# Patient Record
Sex: FEMALE | Race: BLACK OR AFRICAN AMERICAN | Employment: FULL TIME | URBAN - METROPOLITAN AREA
[De-identification: names, ages, dates, MRNs, and addresses within clinical notes are randomized per-mention and may not be internally consistent; named-entity substitution may affect disease eponyms.]

---

## 2019-02-01 LAB
EXTERNAL CHLAMYDIA RESULT: NOT DETECTED
N GONORRHOEA RRNA SPEC QL PROBE: NOT DETECTED

## 2021-05-24 NOTE — PROGRESS NOTES
Assessment/Plan:    Encounter for gynecological examination (general) (routine) without abnormal findings  NP here for well check  Noted spotting last month and a couple of times in past 3 years  Otherwise no complaints  Normal breast and pelvic exams  Pap done  Mammo order given  U/S ordered, will contact with results    PMB (postmenopausal bleeding)  Minimal spotting over the past 3 years, not post coital  U/S ordered, suspect atrophy, will contact if endo bx needed  Diagnoses and all orders for this visit:    Breast cancer screening by mammogram  -     Mammo screening bilateral w 3d & cad; Future    Encounter for gynecological examination (general) (routine) without abnormal findings    PMB (postmenopausal bleeding)  -     US pelvis complete w transvaginal; Future    Screening for malignant neoplasm of the cervix  -     Liquid-based pap, screening    Other orders  -     dronedarone (MULTAQ) 400 mg tablet; Take 400 mg by mouth 2 (two) times a day          Subjective:      Patient ID: Mat Diaz is a 56 y o  female  Here for well check  The following portions of the patient's history were reviewed and updated as appropriate: allergies, current medications, past family history, past medical history, past social history, past surgical history and problem list     Review of Systems  Some spotting, light, rare  No breast, bladder, bowel changes  No new persistent pain, bloating, early satiety or pelvic pressure      Objective:      /70   Ht 5' 11" (1 803 m)   Wt 80 7 kg (178 lb)   BMI 24 83 kg/m²          Physical Exam  General appearance: no distress, pleasant   Vitiligo  Neck: thyroid without nodules or thyromegaly, no palpable adenopathy  Lymph nodes: no palpable adenopathy  Breasts: no masses, nodes or skin changes, well healed scars  Abdomen: soft, non tender, no palpable masses  Pelvic exam: normal atrophic external genitalia, pale secondary to vitiligo, urethral meatus normal, vagina atrophic without lesions, cervix atrophic without lesions, uterus small, non tender, no adnexal masses, non tender  Rectal exam: normal sphincter tone, no masses, RV confirms above

## 2021-05-25 ENCOUNTER — OFFICE VISIT (OUTPATIENT)
Dept: OBGYN CLINIC | Facility: CLINIC | Age: 61
End: 2021-05-25
Payer: COMMERCIAL

## 2021-05-25 VITALS
BODY MASS INDEX: 24.92 KG/M2 | HEIGHT: 71 IN | DIASTOLIC BLOOD PRESSURE: 70 MMHG | SYSTOLIC BLOOD PRESSURE: 110 MMHG | WEIGHT: 178 LBS

## 2021-05-25 DIAGNOSIS — Z12.31 BREAST CANCER SCREENING BY MAMMOGRAM: Primary | ICD-10-CM

## 2021-05-25 DIAGNOSIS — N95.0 PMB (POSTMENOPAUSAL BLEEDING): ICD-10-CM

## 2021-05-25 DIAGNOSIS — Z01.419 ENCOUNTER FOR GYNECOLOGICAL EXAMINATION (GENERAL) (ROUTINE) WITHOUT ABNORMAL FINDINGS: ICD-10-CM

## 2021-05-25 DIAGNOSIS — Z12.4 SCREENING FOR MALIGNANT NEOPLASM OF THE CERVIX: ICD-10-CM

## 2021-05-25 PROCEDURE — G0145 SCR C/V CYTO,THINLAYER,RESCR: HCPCS | Performed by: OBSTETRICS & GYNECOLOGY

## 2021-05-25 PROCEDURE — 99386 PREV VISIT NEW AGE 40-64: CPT | Performed by: OBSTETRICS & GYNECOLOGY

## 2021-05-25 PROCEDURE — 87624 HPV HI-RISK TYP POOLED RSLT: CPT | Performed by: OBSTETRICS & GYNECOLOGY

## 2021-05-25 NOTE — PATIENT INSTRUCTIONS
Please schedule ultrasound and I will contact you (on My Chart) with results  Call for any further bleeding

## 2021-05-25 NOTE — ASSESSMENT & PLAN NOTE
NP here for well check  Noted spotting last month and a couple of times in past 3 years  Otherwise no complaints  Normal breast and pelvic exams  Pap done  Mammo order given   U/S ordered, will contact with results

## 2021-05-25 NOTE — ASSESSMENT & PLAN NOTE
Minimal spotting over the past 3 years, not post coital  U/S ordered, suspect atrophy, will contact if endo bx needed

## 2021-05-26 LAB
HPV HR 12 DNA CVX QL NAA+PROBE: NEGATIVE
HPV16 DNA CVX QL NAA+PROBE: NEGATIVE
HPV18 DNA CVX QL NAA+PROBE: NEGATIVE

## 2021-05-27 LAB
LAB AP GYN PRIMARY INTERPRETATION: NORMAL
Lab: NORMAL

## 2021-06-26 ENCOUNTER — TELEPHONE (OUTPATIENT)
Dept: OBGYN CLINIC | Facility: CLINIC | Age: 61
End: 2021-06-26

## 2021-06-26 NOTE — TELEPHONE ENCOUNTER
Please inform of normal ultrasound with Uterus 6 3 cm with stable small fibroids, largest 2 2 cm  EMS 1 mm  R ov 1 6 cm with subcm simple cysts  L ov 1 7 cm with small simple cysts  No need for endometrial biopsy unless bleeding reoccurs

## 2021-06-28 NOTE — TELEPHONE ENCOUNTER
Called and spoke with patient  Reviewed with patient ultrasound results per Dr Jeremy Hammond  Advised no need for endo biopsy unless bleeding reoccurs  She verbalized understanding

## 2022-05-19 DIAGNOSIS — Z12.31 BREAST CANCER SCREENING BY MAMMOGRAM: ICD-10-CM

## 2022-08-15 NOTE — PROGRESS NOTES
Assessment/Plan:    Encounter for gynecological examination (general) (routine) without abnormal findings  All well, no complaints  Normal breast and pelvic exams  Pap 5/2021, neg/neg, due by 2026  Mammo order given  Last colonoscopy 6/2020, due 2025  Dexa next time, no risk factors  Calcium recs reviewed  Diagnoses and all orders for this visit:    Encounter for screening mammogram for malignant neoplasm of breast  -     Mammo screening bilateral w 3d & cad; Future    Family history of breast cancer  -     Mammo screening bilateral w 3d & cad; Future          Subjective:      Patient ID: Wandalee Bence is a 57 y o  female  HPI Here for well check  The following portions of the patient's history were reviewed and updated as appropriate:   She  has a past medical history of A-fib ([address] ) (2012), Celiac disease, History of mammogram (2020), Papanicolaou smear (2019), Tremors of nervous system, and Vitiligo  She   Patient Active Problem List    Diagnosis Date Noted    Encounter for gynecological examination (general) (routine) without abnormal findings 05/25/2021     She  has a past surgical history that includes ORIF TIBIAL PLATEAU (0847); Breast biopsy; Lipoma resection (1985); and Colonoscopy (06/2020)  Her family history includes Breast cancer (age of onset: 61) in her mother; Dementia in her mother  She  reports that she has been smoking cigars  She has never used smokeless tobacco  She reports current alcohol use  She reports that she does not use drugs  Current Outpatient Medications   Medication Sig Dispense Refill    dronedarone (MULTAQ) 400 mg tablet Take 400 mg by mouth 2 (two) times a day       No current facility-administered medications for this visit  She is allergic to oxycodone-aspirin, prochlorperazine, gluten meal - food allergy, and treenut [nuts - food allergy]       Review of Systems  No PMB, breast, bladder, bowel changes   No new persistent pain, bloating, early satiety or pelvic pressure      Objective:      /68   Ht 5' 11 5" (1 816 m)   Wt 81 4 kg (179 lb 6 4 oz)   Breastfeeding No   BMI 24 67 kg/m²          Physical Exam    General appearance: no distress, pleasant   Vitiligo  Neck: thyroid without nodules or thyromegaly, no palpable adenopathy  Lymph nodes: no palpable adenopathy  Breasts: no masses, nodes or skin changes, well healed scars  Abdomen: soft, non tender, no palpable masses  Pelvic exam: normal atrophic external genitalia, pale secondary to vitiligo, urethral meatus normal, vagina atrophic without lesions, cervix atrophic without lesions, uterus small, non tender, no adnexal masses, non tender  Rectal exam: normal sphincter tone, no masses, RV confirms above

## 2022-08-16 ENCOUNTER — ANNUAL EXAM (OUTPATIENT)
Dept: OBGYN CLINIC | Facility: CLINIC | Age: 62
End: 2022-08-16
Payer: COMMERCIAL

## 2022-08-16 VITALS
WEIGHT: 179.4 LBS | SYSTOLIC BLOOD PRESSURE: 120 MMHG | BODY MASS INDEX: 24.3 KG/M2 | DIASTOLIC BLOOD PRESSURE: 68 MMHG | HEIGHT: 72 IN

## 2022-08-16 DIAGNOSIS — Z80.3 FAMILY HISTORY OF BREAST CANCER: ICD-10-CM

## 2022-08-16 DIAGNOSIS — Z01.419 ENCOUNTER FOR GYNECOLOGICAL EXAMINATION (GENERAL) (ROUTINE) WITHOUT ABNORMAL FINDINGS: Primary | ICD-10-CM

## 2022-08-16 DIAGNOSIS — Z12.31 ENCOUNTER FOR SCREENING MAMMOGRAM FOR MALIGNANT NEOPLASM OF BREAST: ICD-10-CM

## 2022-08-16 PROBLEM — N95.0 PMB (POSTMENOPAUSAL BLEEDING): Status: RESOLVED | Noted: 2021-05-25 | Resolved: 2022-08-16

## 2022-08-16 PROCEDURE — S0612 ANNUAL GYNECOLOGICAL EXAMINA: HCPCS | Performed by: OBSTETRICS & GYNECOLOGY

## 2022-08-16 NOTE — PATIENT INSTRUCTIONS
Return to office in one year unless having any problems such as breast changes, bleeding, new persistent pain, new progressive bloating, new problems eating (getting full to quickly) or new constant urinary pressure that does not resolve in one week  Continue to strive for total calcium intake of 1200 mg and vitamin D of 1000 IU in combined dietary and supplement forms  You can only absorb 600 mg of calcium at a time  Avoid excess calcium as this may adversely effect the arteries in the heart  Call in six months to schedule your annual visit

## 2022-08-16 NOTE — LETTER
August 16, 2022     [address] [facility] [address]    Patient: Oscar Darnell   YOB: 1960   Date of Visit: 8/16/2022       Dear Dr Nidhi Simons:    Thank you for referring Oscar Darnell to me for evaluation  Below are my notes for this consultation  If you have questions, please do not hesitate to call me  I look forward to following your patient along with you  Sincerely,        Jonathon Fletcher MD        CC: No Recipients  Jonathon Fletcher MD  8/16/2022  3:38 PM  Sign when Signing Visit  Assessment/Plan:    Encounter for gynecological examination (general) (routine) without abnormal findings  All well, no complaints  Normal breast and pelvic exams  Pap 5/2021, neg/neg, due by 2026  Mammo order given  Last colonoscopy 6/2020, due 2025  Dexa next time, no risk factors  Calcium recs reviewed  Diagnoses and all orders for this visit:    Encounter for screening mammogram for malignant neoplasm of breast  -     Mammo screening bilateral w 3d & cad; Future    Family history of breast cancer  -     Mammo screening bilateral w 3d & cad; Future          Subjective:      Patient ID: Oscar Darnell is a 57 y o  female  HPI Here for well check  The following portions of the patient's history were reviewed and updated as appropriate:   She  has a past medical history of A-fib ([address] ) (2012), Celiac disease, History of mammogram (2020), Papanicolaou smear (2019), Tremors of nervous system, and Vitiligo  She   Patient Active Problem List    Diagnosis Date Noted    Encounter for gynecological examination (general) (routine) without abnormal findings 05/25/2021     She  has a past surgical history that includes ORIF TIBIAL PLATEAU (7217); Breast biopsy; Lipoma resection (1985); and Colonoscopy (06/2020)  Her family history includes Breast cancer (age of onset: 61) in her mother; Dementia in her mother    She  reports that she has been smoking cigars  She has never used smokeless tobacco  She reports current alcohol use  She reports that she does not use drugs  Current Outpatient Medications   Medication Sig Dispense Refill    dronedarone (MULTAQ) 400 mg tablet Take 400 mg by mouth 2 (two) times a day       No current facility-administered medications for this visit  She is allergic to oxycodone-aspirin, prochlorperazine, gluten meal - food allergy, and treenut [nuts - food allergy]       Review of Systems  No PMB, breast, bladder, bowel changes  No new persistent pain, bloating, early satiety or pelvic pressure      Objective:      /68   Ht 5' 11 5" (1 816 m)   Wt 81 4 kg (179 lb 6 4 oz)   Breastfeeding No   BMI 24 67 kg/m²          Physical Exam    General appearance: no distress, pleasant   Vitiligo  Neck: thyroid without nodules or thyromegaly, no palpable adenopathy  Lymph nodes: no palpable adenopathy  Breasts: no masses, nodes or skin changes, well healed scars  Abdomen: soft, non tender, no palpable masses  Pelvic exam: normal atrophic external genitalia, pale secondary to vitiligo, urethral meatus normal, vagina atrophic without lesions, cervix atrophic without lesions, uterus small, non tender, no adnexal masses, non tender  Rectal exam: normal sphincter tone, no masses, RV confirms above

## 2022-08-16 NOTE — ASSESSMENT & PLAN NOTE
All well, no complaints  Normal breast and pelvic exams  Pap 5/2021, neg/neg, due by 2026  Mammo order given  Last colonoscopy 6/2020, due 2025  Dexa next time, no risk factors  Calcium recs reviewed

## 2023-08-30 ENCOUNTER — TELEPHONE (OUTPATIENT)
Dept: OBGYN CLINIC | Facility: CLINIC | Age: 63
End: 2023-08-30

## 2023-08-30 DIAGNOSIS — Z12.31 ENCOUNTER FOR SCREENING MAMMOGRAM FOR MALIGNANT NEOPLASM OF BREAST: Primary | ICD-10-CM

## 2023-08-30 NOTE — TELEPHONE ENCOUNTER
Mammogram order placed and faxed to [facility] [phone] and received confirmation. Spoke with patient and informed her of the fax sent. Patient verbalized understanding and voiced appreciation.

## 2023-08-30 NOTE — TELEPHONE ENCOUNTER
Has an  Mammogram order, requesting to have an updated order generated and faxed to [address] [place] [address] Drive

## 2023-08-31 NOTE — TELEPHONE ENCOUNTER
Received call from [facility] yesterday's fax went to [facility]. They have not scanned to patient chart and patient is there now. Requesting mammo order be refaxed to [phone]. Order faxed as requested.

## 2023-09-05 DIAGNOSIS — Z12.31 ENCOUNTER FOR SCREENING MAMMOGRAM FOR MALIGNANT NEOPLASM OF BREAST: ICD-10-CM

## 2023-09-06 NOTE — PROGRESS NOTES
Assessment/Plan:    Encounter for gynecological examination (general) (routine) without abnormal findings  All well, no complaints. Using Replens for dryness, NSA. Normal breast and pelvic exams. Last pap 5/25/21 neg/HPV neg; will repeat 2025  Mammo order given, last 8/31/23  Colonoscopy 6/2020, due 2025  Dexa orders given, will contact with results. Calcium recs reviewed. Diagnoses and all orders for this visit:    Encounter for gynecological examination (general) (routine) without abnormal findings    Asymptomatic menopausal state  -     DXA bone density spine hip and pelvis; Future    Osteoporosis screening  -     DXA bone density spine hip and pelvis; Future    Encounter for screening mammogram for malignant neoplasm of breast  -     Mammo screening bilateral w 3d & cad; Future    Family history of breast cancer  -     Mammo screening bilateral w 3d & cad; Future    Other orders  -     meloxicam (MOBIC) 15 mg tablet; if needed          Subjective:      Patient ID: Peterson Epley is a 70 y.o. female. HPI Here for well check. The following portions of the patient's history were reviewed and updated as appropriate:   She  has a past medical history of A-fib ([address]) (2012), Celiac disease, History of mammogram (2020), History of screening mammography (08/31/2023), Papanicolaou smear (2019), Tremors of nervous system, and Vitiligo. She   Patient Active Problem List    Diagnosis Date Noted   • Encounter for gynecological examination (general) (routine) without abnormal findings 05/25/2021     She  has a past surgical history that includes ORIF TIBIAL PLATEAU (1362); Breast biopsy; Lipoma resection (1985); and Colonoscopy (06/2020). Her family history includes Breast cancer (age of onset: 61) in her mother; Dementia in her mother. She  reports that she has quit smoking. Her smoking use included cigars. She has never used smokeless tobacco. She reports current alcohol use.  She reports that she does not use drugs. Current Outpatient Medications   Medication Sig Dispense Refill   • dronedarone (MULTAQ) 400 mg tablet Take 400 mg by mouth 2 (two) times a day     • meloxicam (MOBIC) 15 mg tablet if needed       No current facility-administered medications for this visit. She is allergic to oxycodone-aspirin, prochlorperazine, gluten meal - food allergy, and treenut [nuts - food allergy]. .    Review of Systems  No PMB, breast, bladder, bowel changes.  No new persistent pain, bloating, early satiety or pelvic pressure      Objective:      /72   Ht 6' 1" (1.854 m)   Wt 78.6 kg (173 lb 3.2 oz)   Breastfeeding No   BMI 22.85 kg/m²          Physical Exam    General appearance: no distress, pleasant, vitiligo  Neck: thyroid without nodules or thyromegaly, no palpable adenopathy  Lymph nodes: no palpable adenopathy  Breasts: no masses, nodes or skin changes  Abdomen: soft, non tender, no palpable masses  Pelvic exam: normal atrophic external genitalia, pale secondary to vitiligo, urethral meatus normal, vagina atrophic without lesions, cervix atrophic without lesions, uterus small, non tender, no adnexal masses, non tender  Rectal exam: normal sphincter tone, no masses, RV confirms above

## 2023-09-08 ENCOUNTER — ANNUAL EXAM (OUTPATIENT)
Dept: OBGYN CLINIC | Facility: CLINIC | Age: 63
End: 2023-09-08
Payer: COMMERCIAL

## 2023-09-08 VITALS
HEIGHT: 72 IN | SYSTOLIC BLOOD PRESSURE: 118 MMHG | BODY MASS INDEX: 23.46 KG/M2 | WEIGHT: 173.2 LBS | DIASTOLIC BLOOD PRESSURE: 72 MMHG

## 2023-09-08 DIAGNOSIS — Z13.820 OSTEOPOROSIS SCREENING: ICD-10-CM

## 2023-09-08 DIAGNOSIS — Z01.419 ENCOUNTER FOR GYNECOLOGICAL EXAMINATION (GENERAL) (ROUTINE) WITHOUT ABNORMAL FINDINGS: Primary | ICD-10-CM

## 2023-09-08 DIAGNOSIS — Z80.3 FAMILY HISTORY OF BREAST CANCER: ICD-10-CM

## 2023-09-08 DIAGNOSIS — Z12.31 ENCOUNTER FOR SCREENING MAMMOGRAM FOR MALIGNANT NEOPLASM OF BREAST: ICD-10-CM

## 2023-09-08 DIAGNOSIS — Z78.0 ASYMPTOMATIC MENOPAUSAL STATE: ICD-10-CM

## 2023-09-08 PROCEDURE — G0101 CA SCREEN;PELVIC/BREAST EXAM: HCPCS | Performed by: OBSTETRICS & GYNECOLOGY

## 2023-09-08 RX ORDER — MELOXICAM 15 MG/1
TABLET ORAL AS NEEDED
COMMUNITY
Start: 2023-08-11

## 2023-09-08 NOTE — ASSESSMENT & PLAN NOTE
All well, no complaints. Using Replens for dryness, NSA. Normal breast and pelvic exams. Last pap 5/25/21 neg/HPV neg; will repeat 2025  Mammo order given, last 8/31/23  Colonoscopy 6/2020, due 2025  Dexa orders given, will contact with results. Calcium recs reviewed.

## 2023-09-08 NOTE — LETTER
September 8, 2023 [address] [facility] [address]    Patient: Juve West   YOB: 1960   Date of Visit: 9/8/2023       Dear Dr. Seth Arthur:    Thank you for referring Juve West to me for evaluation. Below are my notes for this consultation. If you have questions, please do not hesitate to call me. I look forward to following your patient along with you. Sincerely,        Lois Zelaya MD        CC: No Recipients    Lois Zelaya MD  9/8/2023 11:08 AM  Sign when Signing Visit  Assessment/Plan:    Encounter for gynecological examination (general) (routine) without abnormal findings  All well, no complaints. Using Replens for dryness, NSA. Normal breast and pelvic exams. Last pap 5/25/21 neg/HPV neg; will repeat 2025  Mammo order given, last 8/31/23  Colonoscopy 6/2020, due 2025  Dexa orders given, will contact with results. Calcium recs reviewed. Diagnoses and all orders for this visit:    Encounter for gynecological examination (general) (routine) without abnormal findings    Asymptomatic menopausal state  -     DXA bone density spine hip and pelvis; Future    Osteoporosis screening  -     DXA bone density spine hip and pelvis; Future    Encounter for screening mammogram for malignant neoplasm of breast  -     Mammo screening bilateral w 3d & cad; Future    Family history of breast cancer  -     Mammo screening bilateral w 3d & cad; Future    Other orders  -     meloxicam (MOBIC) 15 mg tablet; if needed         Subjective:     Patient ID: Juve West is a 70 y.o. female. HPI Here for well check. The following portions of the patient's history were reviewed and updated as appropriate:   She  has a past medical history of A-fib ([address]) (2012), Celiac disease, History of mammogram (2020), History of screening mammography (08/31/2023), Papanicolaou smear (2019), Tremors of nervous system, and Vitiligo.   She Patient Active Problem List    Diagnosis Date Noted   • Encounter for gynecological examination (general) (routine) without abnormal findings 05/25/2021     She  has a past surgical history that includes ORIF TIBIAL PLATEAU (2991); Breast biopsy; Lipoma resection (1985); and Colonoscopy (06/2020). Her family history includes Breast cancer (age of onset: 61) in her mother; Dementia in her mother. She  reports that she has quit smoking. Her smoking use included cigars. She has never used smokeless tobacco. She reports current alcohol use. She reports that she does not use drugs. Current Outpatient Medications   Medication Sig Dispense Refill   • dronedarone (MULTAQ) 400 mg tablet Take 400 mg by mouth 2 (two) times a day     • meloxicam (MOBIC) 15 mg tablet if needed       No current facility-administered medications for this visit. She is allergic to oxycodone-aspirin, prochlorperazine, gluten meal - food allergy, and treenut [nuts - food allergy]. .    Review of Systems No PMB, breast, bladder, bowel changes.  No new persistent pain, bloating, early satiety or pelvic pressure      Objective:      /72   Ht 6' 1" (1.854 m)   Wt 78.6 kg (173 lb 3.2 oz)   Breastfeeding No   BMI 22.85 kg/m²         Physical Exam   General appearance: no distress, pleasant, vitiligo  Neck: thyroid without nodules or thyromegaly, no palpable adenopathy  Lymph nodes: no palpable adenopathy  Breasts: no masses, nodes or skin changes  Abdomen: soft, non tender, no palpable masses  Pelvic exam: normal atrophic external genitalia, pale secondary to vitiligo, urethral meatus normal, vagina atrophic without lesions, cervix atrophic without lesions, uterus small, non tender, no adnexal masses, non tender  Rectal exam: normal sphincter tone, no masses, RV confirms above

## 2023-09-26 DIAGNOSIS — Z13.820 OSTEOPOROSIS SCREENING: ICD-10-CM

## 2023-09-26 DIAGNOSIS — Z78.0 ASYMPTOMATIC MENOPAUSAL STATE: ICD-10-CM

## 2023-11-01 ENCOUNTER — TELEPHONE (OUTPATIENT)
Dept: OBGYN CLINIC | Facility: CLINIC | Age: 63
End: 2023-11-01

## 2023-11-01 NOTE — TELEPHONE ENCOUNTER
Please call with [employer] message from 9/26/23. Thanks     Chandan Bae. Your bone density shows osteoporosis in your hip and spine. We need to discuss the management of your bone health. Please call the office for an appointment so we can go over options to increase your bone density and decrease the risk of fracture. See you soon.

## 2023-11-01 NOTE — TELEPHONE ENCOUNTER
Spoke with patient and reviewed results and provider's recommendations. Patient is agreeable to making an appointment and was transferred to  to be scheduled.

## 2023-11-08 PROBLEM — M81.0 AGE-RELATED OSTEOPOROSIS WITHOUT CURRENT PATHOLOGICAL FRACTURE: Status: ACTIVE | Noted: 2023-11-08

## 2023-11-08 NOTE — PROGRESS NOTES
Assessment/Plan:    Age-related osteoporosis without current pathological fracture  First dexa reviewed with osteoporosis of hip, femoral neck and spine. Lowest T score, spine T-4.8. No known risk factors. +milk and cheese as child, mother with osteoporosis but no hip fractures. Discussed osteoporosis and impact on bone health. Reviewed risk of hip and vertebral fracture. Reviewed impact of hip fracture on overall morbidity and mortality. Encouraged consideration of health impact of diagnosis balanced with risks of medications when making decisions on medications versus conservative monitoring. Recommend consideration of medication to decrease fracture risk. Risks and benefits of bisphosphonates and specifically Reclast reviewed. Declines oral meds. Aware of increased risk of osteonecrosis of jaw, muscle and joint aches and increased risk of long bone fracture with prolonged use of bisphosphonates. Reviewed appropriate calcium intake in dairy and supplement form to achieve recommended 1200 per day. Advised against over supplementing of calcium which may be associated with cardiovascular risk. Vitamin D supplementation recommended and dosing discussed. Offered rheumatology evaluation due to marked osteoporosis of spine, declines. Will check Reclast labs and PTH and schedul Reclast when resulted. Repeat dexa in 2 years for follow up. Diagnoses and all orders for this visit:    Age-related osteoporosis without current pathological fracture  -     Creatinine, serum; Future  -     Calcium; Future  -     Vitamin D 25 hydroxy; Future  -     PTH, intact; Future  -     Creatinine, serum  -     Calcium  -     Vitamin D 25 hydroxy  -     PTH, intact          Subjective:      Patient ID: Claudio Zamarripa is a 70 y.o. female. HPI Here to discuss new diagnosis of osteoporosis.     The following portions of the patient's history were reviewed and updated as appropriate: She  has a past medical history of A-fib ([address]) (2012), Celiac disease, History of mammogram (2020), History of screening mammography (08/31/2023), Papanicolaou smear (2019), Tremors of nervous system, and Vitiligo. She   Patient Active Problem List    Diagnosis Date Noted    Age-related osteoporosis without current pathological fracture 11/08/2023    Encounter for gynecological examination (general) (routine) without abnormal findings 05/25/2021     She  has a past surgical history that includes ORIF TIBIAL PLATEAU (5497); Breast biopsy; Lipoma resection (1985); and Colonoscopy (06/2020). Her family history includes Breast cancer (age of onset: 61) in her mother; Dementia in her mother. She  reports that she has quit smoking. Her smoking use included cigars. She has never used smokeless tobacco. She reports current alcohol use. She reports that she does not use drugs. Current Outpatient Medications   Medication Sig Dispense Refill    dronedarone (MULTAQ) 400 mg tablet Take 400 mg by mouth 2 (two) times a day      meloxicam (MOBIC) 15 mg tablet if needed       No current facility-administered medications for this visit. She is allergic to oxycodone-aspirin, prochlorperazine, gluten meal - food allergy, and treenut [nuts - food allergy]. .    Review of Systems      Objective:      /68 (BP Location: Left arm, Patient Position: Sitting, Cuff Size: Large)   Ht 5' 10.75" (1.797 m)   Wt 78.5 kg (173 lb)   BMI 24.30 kg/m²          Physical Exam    Appears well, no apparent distress. Does not appear anxious or depressed.       DATA:  9/23/23 Dexa: Left hip T-2.6; fem neck T-2.6; spine T-4.8

## 2023-11-10 ENCOUNTER — OFFICE VISIT (OUTPATIENT)
Dept: OBGYN CLINIC | Facility: CLINIC | Age: 63
End: 2023-11-10

## 2023-11-10 VITALS
DIASTOLIC BLOOD PRESSURE: 68 MMHG | HEIGHT: 71 IN | WEIGHT: 173 LBS | SYSTOLIC BLOOD PRESSURE: 120 MMHG | BODY MASS INDEX: 24.22 KG/M2

## 2023-11-10 DIAGNOSIS — M81.0 AGE-RELATED OSTEOPOROSIS WITHOUT CURRENT PATHOLOGICAL FRACTURE: Primary | ICD-10-CM

## 2023-11-10 NOTE — LETTER
November 10, 2023     [address] [facility] [address]    Patient: Gabe Duvall   YOB: 1960   Date of Visit: 11/10/2023       Dear Dr. Etienne Carty:    Gabe Duvall was in to see me today for evaluation. Below are my notes for this consultation. If you have questions, please do not hesitate to call me. I look forward to following your patient along with you. Sincerely,        Charles Martinez MD        CC: No Recipients    Charles Martinez MD  11/10/2023  3:14 PM  Sign when Signing Visit  Assessment/Plan:    Age-related osteoporosis without current pathological fracture  First dexa reviewed with osteoporosis of hip, femoral neck and spine. Lowest T score, spine T-4.8. No known risk factors. +milk and cheese as child, mother with osteoporosis but no hip fractures. Discussed osteoporosis and impact on bone health. Reviewed risk of hip and vertebral fracture. Reviewed impact of hip fracture on overall morbidity and mortality. Encouraged consideration of health impact of diagnosis balanced with risks of medications when making decisions on medications versus conservative monitoring. Recommend consideration of medication to decrease fracture risk. Risks and benefits of bisphosphonates and specifically Reclast reviewed. Declines oral meds. Aware of increased risk of osteonecrosis of jaw, muscle and joint aches and increased risk of long bone fracture with prolonged use of bisphosphonates. Reviewed appropriate calcium intake in dairy and supplement form to achieve recommended 1200 per day. Advised against over supplementing of calcium which may be associated with cardiovascular risk. Vitamin D supplementation recommended and dosing discussed. Offered rheumatology evaluation due to marked osteoporosis of spine, declines. Will check Reclast labs and PTH and schedul Reclast when resulted. Repeat dexa in 2 years for follow up.        Diagnoses and all orders for this visit:    Age-related osteoporosis without current pathological fracture  -     Creatinine, serum; Future  -     Calcium; Future  -     Vitamin D 25 hydroxy; Future  -     PTH, intact; Future  -     Creatinine, serum  -     Calcium  -     Vitamin D 25 hydroxy  -     PTH, intact          Subjective:      Patient ID: Edwina Garcia is a 70 y.o. female. HPI Here to discuss new diagnosis of osteoporosis. The following portions of the patient's history were reviewed and updated as appropriate: She  has a past medical history of A-fib ([address]) (2012), Celiac disease, History of mammogram (2020), History of screening mammography (08/31/2023), Papanicolaou smear (2019), Tremors of nervous system, and Vitiligo. She   Patient Active Problem List    Diagnosis Date Noted   • Age-related osteoporosis without current pathological fracture 11/08/2023   • Encounter for gynecological examination (general) (routine) without abnormal findings 05/25/2021     She  has a past surgical history that includes ORIF TIBIAL PLATEAU (6559); Breast biopsy; Lipoma resection (1985); and Colonoscopy (06/2020). Her family history includes Breast cancer (age of onset: 61) in her mother; Dementia in her mother. She  reports that she has quit smoking. Her smoking use included cigars. She has never used smokeless tobacco. She reports current alcohol use. She reports that she does not use drugs. Current Outpatient Medications   Medication Sig Dispense Refill   • dronedarone (MULTAQ) 400 mg tablet Take 400 mg by mouth 2 (two) times a day     • meloxicam (MOBIC) 15 mg tablet if needed       No current facility-administered medications for this visit. She is allergic to oxycodone-aspirin, prochlorperazine, gluten meal - food allergy, and treenut [nuts - food allergy]. .    Review of Systems      Objective:      /68 (BP Location: Left arm, Patient Position: Sitting, Cuff Size: Large)   Ht 5' 10.75" (1.797 m) Wt 78.5 kg (173 lb)   BMI 24.30 kg/m²          Physical Exam    Appears well, no apparent distress. Does not appear anxious or depressed.       DATA:  9/23/23 Dexa: Left hip T-2.6; fem neck T-2.6; spine T-4.8

## 2023-11-10 NOTE — ASSESSMENT & PLAN NOTE
First dexa reviewed with osteoporosis of hip, femoral neck and spine. Lowest T score, spine T-4.8. No known risk factors. +milk and cheese as child, mother with osteoporosis but no hip fractures. Discussed osteoporosis and impact on bone health. Reviewed risk of hip and vertebral fracture. Reviewed impact of hip fracture on overall morbidity and mortality. Encouraged consideration of health impact of diagnosis balanced with risks of medications when making decisions on medications versus conservative monitoring. Recommend consideration of medication to decrease fracture risk. Risks and benefits of bisphosphonates and specifically Reclast reviewed. Declines oral meds. Aware of increased risk of osteonecrosis of jaw, muscle and joint aches and increased risk of long bone fracture with prolonged use of bisphosphonates. Reviewed appropriate calcium intake in dairy and supplement form to achieve recommended 1200 per day. Advised against over supplementing of calcium which may be associated with cardiovascular risk. Vitamin D supplementation recommended and dosing discussed. Offered rheumatology evaluation due to marked osteoporosis of spine, declines. Will check Reclast labs and PTH and schedul Reclast when resulted. Repeat dexa in 2 years for follow up.

## 2023-11-29 LAB
25(OH)D3+25(OH)D2 SERPL-MCNC: 39.6 NG/ML (ref 30–100)
CALCIUM SERPL-MCNC: 9.3 MG/DL (ref 8.7–10.3)
CREAT SERPL-MCNC: 0.75 MG/DL (ref 0.57–1)
EGFR: 88 ML/MIN/1.73
PTH-INTACT SERPL-MCNC: 44 PG/ML (ref 15–65)

## 2023-12-04 ENCOUNTER — TELEPHONE (OUTPATIENT)
Dept: OBGYN CLINIC | Facility: CLINIC | Age: 63
End: 2023-12-04

## 2023-12-04 NOTE — TELEPHONE ENCOUNTER
Linda, please call Herlinda with normal Reclast labs and PTH for infusion. She would benefit from [facility] infusion as she lives in [place].   Thanks

## 2023-12-07 NOTE — TELEPHONE ENCOUNTER
Spoke with Herlinda, she would like to do more research prior to scheduling infusion. Understands blood work is valid 30 days from draw time. Reviewed days for scheduling in office typically Wednesday or Thursday.  She would like c/b in 2 weeks.

## 2024-01-04 LAB
CREAT SERPL-MCNC: 0.76 MG/DL (ref 0.57–1)
EGFR: 87 ML/MIN/1.73

## 2024-01-05 ENCOUNTER — TELEPHONE (OUTPATIENT)
Dept: OBGYN CLINIC | Facility: CLINIC | Age: 64
End: 2024-01-05

## 2024-01-05 DIAGNOSIS — M81.0 AGE-RELATED OSTEOPOROSIS WITHOUT CURRENT PATHOLOGICAL FRACTURE: Primary | ICD-10-CM

## 2024-01-05 RX ORDER — ZOLEDRONIC ACID 5 MG/100ML
5 INJECTION, SOLUTION INTRAVENOUS ONCE
OUTPATIENT
Start: 2024-01-15

## 2024-01-05 RX ORDER — SODIUM CHLORIDE 9 MG/ML
20 INJECTION, SOLUTION INTRAVENOUS ONCE
OUTPATIENT
Start: 2024-01-15

## 2024-01-05 NOTE — TELEPHONE ENCOUNTER
Message left with plan to schedule first Reclast at infusion center.     I ordered a new Therapy Plan. I called the infusion center at [facility] [phone] to confirm the order was received. I specified the location of the infusion in the Treatment Department field.  RN from dept will call pt to schedule per our communication.

## 2024-01-18 ENCOUNTER — HOSPITAL ENCOUNTER (OUTPATIENT)
Dept: INFUSION CENTER | Facility: HOSPITAL | Age: 64
End: 2024-01-18
Attending: OBSTETRICS & GYNECOLOGY

## 2024-01-24 ENCOUNTER — HOSPITAL ENCOUNTER (OUTPATIENT)
Dept: INFUSION CENTER | Facility: HOSPITAL | Age: 64
Discharge: HOME | End: 2024-01-24
Attending: OBSTETRICS & GYNECOLOGY
Payer: COMMERCIAL

## 2024-01-24 VITALS
SYSTOLIC BLOOD PRESSURE: 118 MMHG | HEIGHT: 72 IN | OXYGEN SATURATION: 98 % | WEIGHT: 172.18 LBS | RESPIRATION RATE: 16 BRPM | BODY MASS INDEX: 23.32 KG/M2 | DIASTOLIC BLOOD PRESSURE: 56 MMHG | TEMPERATURE: 98.2 F | HEART RATE: 64 BPM

## 2024-01-24 DIAGNOSIS — M81.0 AGE-RELATED OSTEOPOROSIS WITHOUT CURRENT PATHOLOGICAL FRACTURE: Primary | ICD-10-CM

## 2024-01-24 RX ORDER — ZOLEDRONIC ACID 5 MG/100ML
5 INJECTION, SOLUTION INTRAVENOUS ONCE
OUTPATIENT
Start: 2025-01-23

## 2024-01-24 RX ORDER — ZOLEDRONIC ACID 5 MG/100ML
5 INJECTION, SOLUTION INTRAVENOUS ONCE
Status: COMPLETED | OUTPATIENT
Start: 2024-01-24 | End: 2024-01-24

## 2024-01-24 RX ORDER — SODIUM CHLORIDE 9 MG/ML
20 INJECTION, SOLUTION INTRAVENOUS ONCE
OUTPATIENT
Start: 2025-01-23

## 2024-01-24 RX ORDER — SODIUM CHLORIDE 9 MG/ML
20 INJECTION, SOLUTION INTRAVENOUS ONCE
Status: COMPLETED | OUTPATIENT
Start: 2024-01-24 | End: 2024-01-24

## 2024-01-24 RX ADMIN — ZOLEDRONIC ACID 5 MG: 0.05 INJECTION, SOLUTION INTRAVENOUS at 14:33

## 2024-01-24 RX ADMIN — SODIUM CHLORIDE 20 ML/HR: 0.9 INJECTION, SOLUTION INTRAVENOUS at 14:28

## 2024-01-24 NOTE — PROGRESS NOTES
Herlinda Danielle  tolerated treatment well with no complications.      Herlinda Danielle is aware of future appt in one year for IV Reclast infusion.    AVS printed and given to Herlinda Danielle:  Yes

## 2024-01-29 ENCOUNTER — TELEPHONE (OUTPATIENT)
Dept: OBGYN CLINIC | Facility: CLINIC | Age: 64
End: 2024-01-29

## 2024-01-29 NOTE — TELEPHONE ENCOUNTER
Patient called into office stating that she has been feeling joint swelling/pain, and a low blood pressure and dizziness upon standing up at work today.  She states symptoms have been off-and-on since she has gotten Reclast infusion on 1/24/2024.  She reports blood pressure was 86/58 today.  Reassured patient that joint pain is a common side effect of the infusion and advised patient to rehydrate.  Advised patient to report to ER if cardiac symptoms of dizziness, SOB, light-headedness increase due to hx of atrial fibrillation.  Patient states she is on Maltac and mobiq and she is unsure if she can take tylenol with those medications.  Dr. Puri, please further advise.

## 2024-01-29 NOTE — TELEPHONE ENCOUNTER
Spoke with pt.   Agonizing joint pain and flu like symptoms the day after 1st Reclast infusion. Better over the weekend.   This AM right knee swollen and near syncope at work today. BP 80/50. No CP or SOB, or signs of a fib (which pt is usually aware). Will reach out to cardiologist as well.  Drove home, no longer syncopal. Will increase po hydration with water and Gatorade. Notes normal urine output.   Taking Mobic, will ice/heat knee. No signs of DVT, warnings given.

## 2024-02-21 PROBLEM — Z01.419 ENCOUNTER FOR GYNECOLOGICAL EXAMINATION (GENERAL) (ROUTINE) WITHOUT ABNORMAL FINDINGS: Status: RESOLVED | Noted: 2021-05-25 | Resolved: 2024-02-21

## 2024-12-20 ENCOUNTER — TELEPHONE (OUTPATIENT)
Age: 64
End: 2024-12-20

## 2024-12-20 NOTE — TELEPHONE ENCOUNTER
Patient requesting a DXA bone density spine, hip and pelvis order.  Annual scheduled with Dr. Puri 3/20/25.

## 2025-03-20 ENCOUNTER — TELEPHONE (OUTPATIENT)
Dept: OBGYN CLINIC | Facility: CLINIC | Age: 65
End: 2025-03-20

## 2025-03-20 NOTE — TELEPHONE ENCOUNTER
Chandan Puri, I rescheduled Herlinda to 4/22/25 since she came to the wrong office. She is requesting a mammogram order if possible prior to her appointment.

## 2025-04-08 DIAGNOSIS — Z12.31 ENCOUNTER FOR SCREENING MAMMOGRAM FOR MALIGNANT NEOPLASM OF BREAST: Primary | ICD-10-CM

## 2025-04-19 NOTE — PROGRESS NOTES
Name: Herlinda Danielle      : 1960      MRN: [id number]  Encounter Provider: Olivia Puri MD  Encounter Date: 2025   Encounter department: [facility] [place]  :  Assessment & Plan  Acute vaginitis  Reports some discharge.   Exam with atrophic vaginitis.   Wet prep c/w above.   Clindamycin vaginal x 7 days, diflucan 150 mg x once.     Orders:    clindamycin (CLEOCIN) 2 % vaginal cream; Insert 1 applicator into the vagina daily at bedtime for 7 days    fluconazole (DIFLUCAN) 150 mg tablet; Take 1 tablet (150 mg total) by mouth once for 1 dose    POCT wet mount    Age-related osteoporosis without current pathological fracture  Poor reaction to Reclast.   Broke both elbows after fall 2024.   Recommend rheumatology evaluation, provider information given.  Last dexa 2023 spine T-4.8, left hip T-2.6, left femoral neck T-2.6       Family history of breast cancer  No change in self breast or clinical breast exams.  Last mammo , has order and appt.           History of Present Illness   HPI  Herlinda Danielle is a 64 y.o. female who presents for breast exam with f/h of breast cancer, concern for yeast infection with discharge and discussion of osteoporosis.      Review of Systems  +discharge  No PMB, breast, bladder, bowel changes. No new persistent pain, bloating, early satiety or pelvic pressure    Past Medical History   Past Medical History:   Diagnosis Date    A-fib ([facility])     Celiac disease     Tremors of nervous system     Vitiligo      Past Surgical History:   Procedure Laterality Date    BREAST BIOPSY      multiple bx, all benign, per pt    COLONOSCOPY  2020    Repeat     LIPOMA RESECTION  1985    Labial    ORIF TIBIAL PLATEAU  2012     Family History   Problem Relation Age of Onset    Breast cancer Mother 60        Dec @85    Dementia Mother     Scoliosis Father     Arthritis Father     Asthma Brother     Tremor Brother     Stroke Maternal  "Grandmother     Hypertension Maternal Grandmother     Brain cancer Maternal Aunt     Parkinsonism Paternal Aunt     Uterine cancer Neg Hx     Ovarian cancer Neg Hx     Colon cancer Neg Hx       reports that she has quit smoking. Her smoking use included cigars. She has never used smokeless tobacco. She reports current alcohol use. She reports that she does not use drugs.  Current Outpatient Medications   Medication Instructions    clindamycin (CLEOCIN) 2 % vaginal cream 1 applicator, Vaginal, Daily at bedtime    dronedarone (MULTAQ) 400 mg, 2 times daily    fluconazole (DIFLUCAN) 150 mg, Oral, Once    meloxicam (MOBIC) 15 mg tablet As needed     Allergies   Allergen Reactions    Oxycodone-Aspirin Other (See Comments) and Vomiting    Prochlorperazine Myalgia     Tardive dyskoneshia, aka Compazine      Gluten Meal - Food Allergy GI Intolerance    Reclast [Zoledronic Acid] Myalgia     Severe joint pain, knee swelling, near syncope days after infusion    Treenut [Nuts - Food Allergy] Blisters         Objective   /68 (BP Location: Left arm, Patient Position: Sitting, Cuff Size: Large)   Ht 5' 10.75\" (1.797 m)   Wt 76 kg (167 lb 9.6 oz)   BMI 23.54 kg/m²      Physical Exam  General appearance: no distress, pleasant  Neck: thyroid without nodules or thyromegaly, no palpable adenopathy  Lymph nodes: no palpable adenopathy  Breasts: no masses, nodes or skin changes, right scars  Abdomen: soft, non tender, no palpable masses  Pelvic exam: normal atrophic external genitalia, urethral meatus normal, vagina atrophic with patchy erythema and discharge, cervix atrophic without lesions, uterus small, non tender, no adnexal masses, non tender  Rectal exam: deferred    Results for orders placed or performed in visit on 04/22/25   POCT wet mount   Result Value Ref Range    WET MOUNT atrophic vaginitis     Yeast, Wet Prep rare hyphae     pH 4.5     Whiff Test n/a     Clue Cells absent     Trich, Wet Prep absent     OTHER " parabasal cells, small bacteria

## 2025-04-19 NOTE — ASSESSMENT & PLAN NOTE
Poor reaction to Reclast.   Broke both elbows after fall 6/2024.   Recommend rheumatology evaluation, provider information given.  Last dexa 9/2023 spine T-4.8, left hip T-2.6, left femoral neck T-2.6

## 2025-04-22 ENCOUNTER — ANNUAL EXAM (OUTPATIENT)
Dept: OBGYN CLINIC | Facility: CLINIC | Age: 65
End: 2025-04-22
Payer: COMMERCIAL

## 2025-04-22 VITALS
HEIGHT: 71 IN | DIASTOLIC BLOOD PRESSURE: 68 MMHG | WEIGHT: 167.6 LBS | SYSTOLIC BLOOD PRESSURE: 112 MMHG | BODY MASS INDEX: 23.46 KG/M2

## 2025-04-22 DIAGNOSIS — M81.0 AGE-RELATED OSTEOPOROSIS WITHOUT CURRENT PATHOLOGICAL FRACTURE: ICD-10-CM

## 2025-04-22 DIAGNOSIS — N76.0 ACUTE VAGINITIS: Primary | ICD-10-CM

## 2025-04-22 DIAGNOSIS — Z80.3 FAMILY HISTORY OF BREAST CANCER: ICD-10-CM

## 2025-04-22 LAB
BV WHIFF TEST VAG QL: ABNORMAL
CLUE CELLS SPEC QL WET PREP: ABNORMAL
PH SMN: 4.5 [PH]
SL AMB POCT WET MOUNT: ABNORMAL
T VAGINALIS VAG QL WET PREP: ABNORMAL
UNIDENT CELLS NFR BLD MANUAL: ABNORMAL %
YEAST VAG QL WET PREP: ABNORMAL

## 2025-04-22 PROCEDURE — 99214 OFFICE O/P EST MOD 30 MIN: CPT | Performed by: OBSTETRICS & GYNECOLOGY

## 2025-04-22 PROCEDURE — 87210 SMEAR WET MOUNT SALINE/INK: CPT | Performed by: OBSTETRICS & GYNECOLOGY

## 2025-04-22 RX ORDER — FLUCONAZOLE 150 MG/1
150 TABLET ORAL ONCE
Qty: 1 TABLET | Refills: 0 | Status: SHIPPED | OUTPATIENT
Start: 2025-04-22 | End: 2025-04-22

## 2025-04-22 RX ORDER — CLINDAMYCIN PHOSPHATE 20 MG/G
1 CREAM VAGINAL
Qty: 40 G | Refills: 0 | Status: SHIPPED | OUTPATIENT
Start: 2025-04-22 | End: 2025-04-29

## 2025-04-22 NOTE — PATIENT INSTRUCTIONS
[facility]  [address]  [address]  [place] [address]  [phone]      Return to office in six months unless having any problems such as breast changes, bleeding, new persistent pain, new progressive bloating, new problems eating (getting full to quickly) or new constant urinary pressure that does not resolve in one week.

## 2025-04-22 NOTE — LETTER
2025     Dora Schwarz DO  [address]  [address] [place] [id number]    Patient: Herlinda Danielle   YOB: 1960   Date of Visit: 2025       Dear Dr. Dora Schwarz, DO:    Herlinda Danielle was in to see me today for evaluation. Below are my notes for this visit.    If you have questions, please do not hesitate to call me. I look forward to following your patient along with you.         Sincerely,        Olivia Puri MD        CC: No Recipients    Olivia Puri MD  2025 10:40 AM  Sign when Signing Visit  Name: Herlinda Danielle      : 1960      MRN: [id number]  Encounter Provider: Olivia Puri MD  Encounter Date: 2025   Encounter department: [facility] [place]  :  Assessment & Plan  Acute vaginitis  Reports some discharge.   Exam with atrophic vaginitis.   Wet prep c/w above.   Clindamycin vaginal x 7 days, diflucan 150 mg x once.     Orders:  •  clindamycin (CLEOCIN) 2 % vaginal cream; Insert 1 applicator into the vagina daily at bedtime for 7 days  •  fluconazole (DIFLUCAN) 150 mg tablet; Take 1 tablet (150 mg total) by mouth once for 1 dose  •  POCT wet mount    Age-related osteoporosis without current pathological fracture  Poor reaction to Reclast.   Broke both elbows after fall 2024.   Recommend rheumatology evaluation, provider information given.  Last dexa 2023 spine T-4.8, left hip T-2.6, left femoral neck T-2.6       Family history of breast cancer  No change in self breast or clinical breast exams.  Last mammo , has order and appt.           History of Present Illness  HPI  Herlinda Danielle is a 64 y.o. female who presents for breast exam with f/h of breast cancer, concern for yeast infection with discharge and discussion of osteoporosis.      Review of Systems  +discharge  No PMB, breast, bladder, bowel changes. No new persistent pain, bloating, early satiety or pelvic  "pressure    Past Medical History  Past Medical History:   Diagnosis Date   • A-fib ([facility]) 2012   • Celiac disease    • Tremors of nervous system    • Vitiligo      Past Surgical History:   Procedure Laterality Date   • BREAST BIOPSY      multiple bx, all benign, per pt   • COLONOSCOPY  06/2020    Repeat 2025   • LIPOMA RESECTION  1985    Labial   • ORIF TIBIAL PLATEAU  2012     Family History   Problem Relation Age of Onset   • Breast cancer Mother 60        Dec @85   • Dementia Mother    • Scoliosis Father    • Arthritis Father    • Asthma Brother    • Tremor Brother    • Stroke Maternal Grandmother    • Hypertension Maternal Grandmother    • Brain cancer Maternal Aunt    • Parkinsonism Paternal Aunt    • Uterine cancer Neg Hx    • Ovarian cancer Neg Hx    • Colon cancer Neg Hx       reports that she has quit smoking. Her smoking use included cigars. She has never used smokeless tobacco. She reports current alcohol use. She reports that she does not use drugs.  Current Outpatient Medications   Medication Instructions   • clindamycin (CLEOCIN) 2 % vaginal cream 1 applicator, Vaginal, Daily at bedtime   • dronedarone (MULTAQ) 400 mg, 2 times daily   • fluconazole (DIFLUCAN) 150 mg, Oral, Once   • meloxicam (MOBIC) 15 mg tablet As needed     Allergies   Allergen Reactions   • Oxycodone-Aspirin Other (See Comments) and Vomiting   • Prochlorperazine Myalgia     Tardive dyskoneshia, aka Compazine     • Gluten Meal - Food Allergy GI Intolerance   • Reclast [Zoledronic Acid] Myalgia     Severe joint pain, knee swelling, near syncope days after infusion   • Treenut [Nuts - Food Allergy] Blisters         Objective  /68 (BP Location: Left arm, Patient Position: Sitting, Cuff Size: Large)   Ht 5' 10.75\" (1.797 m)   Wt 76 kg (167 lb 9.6 oz)   BMI 23.54 kg/m²      Physical Exam  General appearance: no distress, pleasant  Neck: thyroid without nodules or thyromegaly, no palpable adenopathy  Lymph nodes: no palpable " adenopathy  Breasts: no masses, nodes or skin changes, right scars  Abdomen: soft, non tender, no palpable masses  Pelvic exam: normal atrophic external genitalia, urethral meatus normal, vagina atrophic with patchy erythema and discharge, cervix atrophic without lesions, uterus small, non tender, no adnexal masses, non tender  Rectal exam: deferred    Results for orders placed or performed in visit on 04/22/25   POCT wet mount   Result Value Ref Range    WET MOUNT atrophic vaginitis     Yeast, Wet Prep rare hyphae     pH 4.5     Whiff Test n/a     Clue Cells absent     Trich, Wet Prep absent     OTHER parabasal cells, small bacteria